# Patient Record
Sex: FEMALE | Race: WHITE | NOT HISPANIC OR LATINO | URBAN - METROPOLITAN AREA
[De-identification: names, ages, dates, MRNs, and addresses within clinical notes are randomized per-mention and may not be internally consistent; named-entity substitution may affect disease eponyms.]

---

## 2022-12-16 ENCOUNTER — TELEPHONE (OUTPATIENT)
Dept: HEALTH INFORMATION MANAGEMENT | Facility: OTHER | Age: 67
End: 2022-12-16

## 2023-01-06 ENCOUNTER — TELEPHONE (OUTPATIENT)
Dept: HEALTH INFORMATION MANAGEMENT | Facility: OTHER | Age: 68
End: 2023-01-06

## 2023-07-13 ENCOUNTER — APPOINTMENT (OUTPATIENT)
Dept: RADIOLOGY | Facility: MEDICAL CENTER | Age: 68
End: 2023-07-13
Attending: ORTHOPAEDIC SURGERY
Payer: MEDICARE

## 2023-07-13 DIAGNOSIS — M23.91 INTERNAL DERANGEMENT OF RIGHT KNEE: ICD-10-CM

## 2023-07-13 DIAGNOSIS — M25.561 RIGHT KNEE PAIN, UNSPECIFIED CHRONICITY: ICD-10-CM

## 2023-07-13 PROCEDURE — 73721 MRI JNT OF LWR EXTRE W/O DYE: CPT

## 2023-10-03 ENCOUNTER — TELEPHONE (OUTPATIENT)
Dept: HEALTH INFORMATION MANAGEMENT | Facility: OTHER | Age: 68
End: 2023-10-03
Payer: MEDICARE

## 2024-04-21 ENCOUNTER — APPOINTMENT (OUTPATIENT)
Dept: RADIOLOGY | Facility: IMAGING CENTER | Age: 69
End: 2024-04-21
Attending: FAMILY MEDICINE
Payer: MEDICARE

## 2024-04-21 ENCOUNTER — OFFICE VISIT (OUTPATIENT)
Dept: URGENT CARE | Facility: CLINIC | Age: 69
End: 2024-04-21
Payer: MEDICARE

## 2024-04-21 VITALS
WEIGHT: 143 LBS | DIASTOLIC BLOOD PRESSURE: 80 MMHG | TEMPERATURE: 98 F | OXYGEN SATURATION: 97 % | HEART RATE: 85 BPM | BODY MASS INDEX: 22.44 KG/M2 | HEIGHT: 67 IN | SYSTOLIC BLOOD PRESSURE: 122 MMHG | RESPIRATION RATE: 20 BRPM

## 2024-04-21 DIAGNOSIS — M25.511 ACUTE PAIN OF RIGHT SHOULDER: ICD-10-CM

## 2024-04-21 PROCEDURE — 3074F SYST BP LT 130 MM HG: CPT | Performed by: FAMILY MEDICINE

## 2024-04-21 PROCEDURE — 73030 X-RAY EXAM OF SHOULDER: CPT | Mod: TC,FY,RT | Performed by: FAMILY MEDICINE

## 2024-04-21 PROCEDURE — 3079F DIAST BP 80-89 MM HG: CPT | Performed by: FAMILY MEDICINE

## 2024-04-21 PROCEDURE — 99203 OFFICE O/P NEW LOW 30 MIN: CPT | Performed by: FAMILY MEDICINE

## 2024-04-21 RX ORDER — PREDNISONE 20 MG/1
40 TABLET ORAL EVERY MORNING
Qty: 10 TABLET | Refills: 0 | Status: SHIPPED | OUTPATIENT
Start: 2024-04-21 | End: 2024-04-21 | Stop reason: SDUPTHER

## 2024-04-21 RX ORDER — PREDNISONE 20 MG/1
40 TABLET ORAL EVERY MORNING
Qty: 10 TABLET | Refills: 0 | Status: SHIPPED | OUTPATIENT
Start: 2024-04-21 | End: 2024-04-26

## 2024-04-21 NOTE — PROGRESS NOTES
"Subjective     Pati Sabillon is a 68 y.o. female who presents with Shoulder Pain (R PAIN 2 DAYS )      - This is a very pleasant 68 y.o. who has come to the walk-in clinic today for approximately 2 to 3 days right shoulder pain anterior.  No trauma nothing new no new activities.  Pain worse with movement better with rest          ALLERGIES:  Patient has no known allergies.     PMH:  History reviewed. No pertinent past medical history.     PSH:  History reviewed. No pertinent surgical history.    MEDS:    Current Outpatient Medications:     predniSONE (DELTASONE) 20 MG Tab, Take 2 Tablets by mouth every morning for 5 days., Disp: 10 Tablet, Rfl: 0    ** I have documented what I find to be significant in regards to past medical, social, family and surgical history  in my HPI or under PMH/PSH/FH review section, otherwise it is noncontributory **         HPI    Review of Systems   Musculoskeletal:  Positive for joint pain.   All other systems reviewed and are negative.             Objective     /80   Pulse 85   Temp 36.7 °C (98 °F) (Temporal)   Resp 20   Ht 1.702 m (5' 7\")   Wt 64.9 kg (143 lb)   SpO2 97%   BMI 22.40 kg/m²      Physical Exam  Vitals and nursing note reviewed.   Constitutional:       General: She is not in acute distress.     Appearance: Normal appearance. She is well-developed.   HENT:      Head: Normocephalic.   Cardiovascular:      Rate and Rhythm: Normal rate and regular rhythm.      Heart sounds: Normal heart sounds.   Pulmonary:      Effort: Pulmonary effort is normal. No respiratory distress.   Musculoskeletal:        Arms:       Comments: Right shoulder with some mild anterior edema.  Mild anterior tenderness to palpation.  Can flex and abduct to about 60 degrees then limited due to pain.  Good strength range of motion of elbow neurovascularly intact.   Neurological:      Mental Status: She is alert.      Motor: No abnormal muscle tone.   Psychiatric:         Mood and Affect: Mood " normal.         Behavior: Behavior normal.                             Assessment & Plan     1. Acute pain of right shoulder  DX-SHOULDER 2+ RIGHT    predniSONE (DELTASONE) 20 MG Tab    Referral to Orthopedics          - Dx, plan & d/c instructions discussed   - OTC  Tylenol as needed      Follow up with your regular primary care providers office within a week to keep them updated and informed of this visit and for regular routine health maintenance check-ups. ER if not improving in 2-3 days or if feeling/getting worse. (If you do not have a primary care provider and need to schedule one you may call [facility] at [phone] to do this).        Patient left in stable condition       Discussed if any testing, labs or imaging studies are obtained outside of the [facility] facility, it is their responsibility to contact the Urgent Care and let us know that it was done and get us the results so adequate follow up can be initiated    Pertinent prior lab work and/or imaging studies in Epic have been reviewed by me today on day of this visit and taken into account for my treatment and plan today    Pertinent PMH/PSH and/or chronic conditions and medications if any were reviewed today and taken into account for my treatment and plan today    Pertinent prior office visit notes in [facility] have been reviewed by me today on day of this visit.    Please note that this dictation may have been created using voice recognition software, if so I have made every reasonable attempt to correct obvious errors, but I expect that there are errors of grammar and possibly content that I did not discover before finalizing the note.

## 2024-06-13 ENCOUNTER — OFFICE VISIT (OUTPATIENT)
Dept: MEDICAL GROUP | Age: 69
End: 2024-06-13
Payer: MEDICARE

## 2024-06-13 VITALS
HEART RATE: 87 BPM | WEIGHT: 142 LBS | HEIGHT: 67 IN | TEMPERATURE: 98.2 F | DIASTOLIC BLOOD PRESSURE: 84 MMHG | SYSTOLIC BLOOD PRESSURE: 134 MMHG | OXYGEN SATURATION: 100 % | BODY MASS INDEX: 22.29 KG/M2

## 2024-06-13 DIAGNOSIS — N95.1 MENOPAUSAL STATE: ICD-10-CM

## 2024-06-13 DIAGNOSIS — Z78.0 POSTMENOPAUSAL STATUS (AGE-RELATED) (NATURAL): ICD-10-CM

## 2024-06-13 DIAGNOSIS — Z00.00 BLOOD TESTS FOR ROUTINE GENERAL PHYSICAL EXAMINATION: ICD-10-CM

## 2024-06-13 DIAGNOSIS — Z12.11 COLON CANCER SCREENING: ICD-10-CM

## 2024-06-13 DIAGNOSIS — D17.1 LIPOMA OF BACK: ICD-10-CM

## 2024-06-13 DIAGNOSIS — Z23 NEED FOR VACCINATION: ICD-10-CM

## 2024-06-13 DIAGNOSIS — M15.9 PRIMARY OSTEOARTHRITIS INVOLVING MULTIPLE JOINTS: ICD-10-CM

## 2024-06-13 DIAGNOSIS — Z12.31 ENCOUNTER FOR SCREENING MAMMOGRAM FOR BREAST CANCER: ICD-10-CM

## 2024-06-13 PROBLEM — M15.0 PRIMARY OSTEOARTHRITIS INVOLVING MULTIPLE JOINTS: Status: ACTIVE | Noted: 2024-06-13

## 2024-06-13 PROCEDURE — 90715 TDAP VACCINE 7 YRS/> IM: CPT | Performed by: STUDENT IN AN ORGANIZED HEALTH CARE EDUCATION/TRAINING PROGRAM

## 2024-06-13 PROCEDURE — 90471 IMMUNIZATION ADMIN: CPT | Performed by: STUDENT IN AN ORGANIZED HEALTH CARE EDUCATION/TRAINING PROGRAM

## 2024-06-13 PROCEDURE — 99214 OFFICE O/P EST MOD 30 MIN: CPT | Mod: 25 | Performed by: STUDENT IN AN ORGANIZED HEALTH CARE EDUCATION/TRAINING PROGRAM

## 2024-06-13 PROCEDURE — 3079F DIAST BP 80-89 MM HG: CPT | Performed by: STUDENT IN AN ORGANIZED HEALTH CARE EDUCATION/TRAINING PROGRAM

## 2024-06-13 PROCEDURE — 3075F SYST BP GE 130 - 139MM HG: CPT | Performed by: STUDENT IN AN ORGANIZED HEALTH CARE EDUCATION/TRAINING PROGRAM

## 2024-06-13 RX ORDER — MELOXICAM 7.5 MG/1
7.5 TABLET ORAL DAILY
Qty: 30 TABLET | Refills: 3 | Status: SHIPPED | OUTPATIENT
Start: 2024-06-13

## 2024-06-13 ASSESSMENT — ENCOUNTER SYMPTOMS
DIZZINESS: 0
HEADACHES: 0
CHILLS: 0
WEAKNESS: 0
BACK PAIN: 0
MYALGIAS: 0
NECK PAIN: 0
FEVER: 0
BRUISES/BLEEDS EASILY: 0
ABDOMINAL PAIN: 0
WHEEZING: 0
COUGH: 0
PALPITATIONS: 0
BLURRED VISION: 0
SHORTNESS OF BREATH: 0
DOUBLE VISION: 0
DEPRESSION: 0
BLOOD IN STOOL: 0

## 2024-06-13 ASSESSMENT — PATIENT HEALTH QUESTIONNAIRE - PHQ9: CLINICAL INTERPRETATION OF PHQ2 SCORE: 0

## 2024-06-13 NOTE — PROGRESS NOTES
Subjective:     CC: Establish care    HPI:   History of Present Illness  The patient is a 68-year-old female coming to [facility].    The patient has been diagnosed with osteoarthritis since the age of 21. In 2022, she sustained a kneecap fracture due to a fall while carrying a flat cart. Despite the fracture, she did not require surgical intervention. She received a cortisone injection on Monday, which took an extended period to heal. Despite undergoing physical therapy, her gait has slowed. Her medical history includes irritable bowel syndrome and hypoglycemia, diagnosed during her youth. She experiences mild shakiness when eating, although she has not been formally diagnosed. Her current medications include Tylenol, as she is unable to take ibuprofen. It has been some time since her last lab work or bone density scan. Her last mammogram was conducted over 10 years ago. She has not received a pneumonia vaccine. She reports severe pain in her hands, which she attributes to arthritis.        Problem   Primary Osteoarthritis Involving Multiple Joints       ROS:  Review of Systems   Constitutional:  Negative for chills, fever and malaise/fatigue.   HENT:  Negative for nosebleeds and tinnitus.    Eyes:  Negative for blurred vision and double vision.   Respiratory:  Negative for cough, shortness of breath and wheezing.    Cardiovascular:  Negative for chest pain, palpitations and leg swelling.   Gastrointestinal:  Negative for abdominal pain, blood in stool and melena.   Genitourinary:  Negative for dysuria, frequency and urgency.   Musculoskeletal:  Positive for joint pain. Negative for back pain, myalgias and neck pain.   Skin:  Negative for rash.   Neurological:  Negative for dizziness, weakness and headaches.   Endo/Heme/Allergies:  Does not bruise/bleed easily.   Psychiatric/Behavioral:  Negative for depression and suicidal ideas.        Objective:     Exam:  /84 (BP Location: Right arm, Patient Position:  "Sitting, BP Cuff Size: Adult)   Pulse 87   Temp 36.8 °C (98.2 °F) (Temporal)   Ht 1.702 m (5' 7\")   Wt 64.4 kg (142 lb)   SpO2 100%   BMI 22.24 kg/m²  Body mass index is 22.24 kg/m².    Physical Exam  Vitals reviewed.   Constitutional:       General: She is not in acute distress.  HENT:      Head: Normocephalic and atraumatic.      Right Ear: Tympanic membrane and ear canal normal.      Left Ear: Tympanic membrane and ear canal normal.      Mouth/Throat:      Mouth: Mucous membranes are moist.   Eyes:      General: No scleral icterus.     Extraocular Movements: Extraocular movements intact.      Pupils: Pupils are equal, round, and reactive to light.   Cardiovascular:      Rate and Rhythm: Normal rate and regular rhythm.      Pulses: Normal pulses.      Heart sounds: Normal heart sounds. No murmur heard.  Pulmonary:      Effort: Pulmonary effort is normal. No respiratory distress.      Breath sounds: Normal breath sounds. No wheezing.   Abdominal:      General: There is no distension.      Palpations: Abdomen is soft.      Tenderness: There is no abdominal tenderness. There is no guarding or rebound.   Musculoskeletal:      Cervical back: Normal range of motion and neck supple.      Right lower leg: No edema.      Left lower leg: No edema.   Lymphadenopathy:      Cervical: No cervical adenopathy.   Skin:     General: Skin is warm.      Capillary Refill: Capillary refill takes less than 2 seconds.      Coloration: Skin is not jaundiced.   Neurological:      General: No focal deficit present.      Mental Status: She is alert.      Cranial Nerves: No cranial nerve deficit.   Psychiatric:         Mood and Affect: Mood normal.         Behavior: Behavior normal.       Labs: Ordered    Assessment & Plan:     68 y.o. female with the following -     1. Primary osteoarthritis involving multiple joints  -Chronic, stable  -Osteoarthritis affecting multiple distal interphalangeal joints and knees  -Currently taking Tylenol " for pain.  In the past took ibuprofen, however it caused significant GI distress  -Patient states mild to moderate pain most days of the week  -I recommended her a trial of Mobic    - meloxicam (MOBIC) 7.5 MG Tab; Take 1 Tablet by mouth every day.  Dispense: 30 Tablet; Refill: 3    2. Lipoma of back  -Chronic large lipoma in her left upper back measuring approximately 15 x 13 cm  -Not causing significant pain  -In the past patient consider undergoing surgery, however postpone it  -I advised the patient that if she does not have significant pain or discomfort she should avoid undergoing this procedure    3. Colon cancer screening  -Due for screening  - COLOGUARD (FIT DNA)    4. Postmenopausal status (age-related) (natural)  5. Menopausal state  -Due for screening  - DS-BONE DENSITY STUDY (DEXA); Future    6. Blood tests for routine general physical examination  -Appropriate lab work for her age  - CBC WITH DIFFERENTIAL; Future  - Comp Metabolic Panel; Future  - HEMOGLOBIN A1C; Future  - Lipid Profile; Future  - VITAMIN D,25 HYDROXY (DEFICIENCY); Future  - TSH; Future    7. Encounter for screening mammogram for breast cancer  -Due for screening  - MA-SCREENING MAMMO BILAT W/CAD; Future    8. Need for vaccination  -Overdue for vaccination  - TDAP VACCINE =>8YO IM       Return in about 4 weeks (around 7/11/2024) for Labs.    Please note that this dictation was created using voice recognition software. I have made every reasonable attempt to correct obvious errors, but I expect that there are errors of grammar and possibly content that I did not discover before finalizing the note.

## 2024-06-17 ENCOUNTER — HOSPITAL ENCOUNTER (OUTPATIENT)
Dept: LAB | Facility: MEDICAL CENTER | Age: 69
End: 2024-06-17
Attending: STUDENT IN AN ORGANIZED HEALTH CARE EDUCATION/TRAINING PROGRAM
Payer: MEDICARE

## 2024-06-17 DIAGNOSIS — Z00.00 BLOOD TESTS FOR ROUTINE GENERAL PHYSICAL EXAMINATION: ICD-10-CM

## 2024-06-17 LAB
25(OH)D3 SERPL-MCNC: 27 NG/ML (ref 30–100)
ALBUMIN SERPL BCP-MCNC: 4.2 G/DL (ref 3.2–4.9)
ALBUMIN/GLOB SERPL: 1.4 G/DL
ALP SERPL-CCNC: 77 U/L (ref 30–99)
ALT SERPL-CCNC: 16 U/L (ref 2–50)
ANION GAP SERPL CALC-SCNC: 12 MMOL/L (ref 7–16)
AST SERPL-CCNC: 22 U/L (ref 12–45)
BASOPHILS # BLD AUTO: 0.9 % (ref 0–1.8)
BASOPHILS # BLD: 0.1 K/UL (ref 0–0.12)
BILIRUB SERPL-MCNC: 0.4 MG/DL (ref 0.1–1.5)
BUN SERPL-MCNC: 16 MG/DL (ref 8–22)
CALCIUM ALBUM COR SERPL-MCNC: 9.3 MG/DL (ref 8.5–10.5)
CALCIUM SERPL-MCNC: 9.5 MG/DL (ref 8.5–10.5)
CHLORIDE SERPL-SCNC: 104 MMOL/L (ref 96–112)
CHOLEST SERPL-MCNC: 167 MG/DL (ref 100–199)
CO2 SERPL-SCNC: 25 MMOL/L (ref 20–33)
CREAT SERPL-MCNC: 0.48 MG/DL (ref 0.5–1.4)
EOSINOPHIL # BLD AUTO: 0.48 K/UL (ref 0–0.51)
EOSINOPHIL NFR BLD: 4.5 % (ref 0–6.9)
ERYTHROCYTE [DISTWIDTH] IN BLOOD BY AUTOMATED COUNT: 46.9 FL (ref 35.9–50)
EST. AVERAGE GLUCOSE BLD GHB EST-MCNC: 117 MG/DL
FASTING STATUS PATIENT QL REPORTED: NORMAL
GFR SERPLBLD CREATININE-BSD FMLA CKD-EPI: 104 ML/MIN/1.73 M 2
GLOBULIN SER CALC-MCNC: 3.1 G/DL (ref 1.9–3.5)
GLUCOSE SERPL-MCNC: 98 MG/DL (ref 65–99)
HBA1C MFR BLD: 5.7 % (ref 4–5.6)
HCT VFR BLD AUTO: 43.3 % (ref 37–47)
HDLC SERPL-MCNC: 75 MG/DL
HGB BLD-MCNC: 14.5 G/DL (ref 12–16)
IMM GRANULOCYTES # BLD AUTO: 0.03 K/UL (ref 0–0.11)
IMM GRANULOCYTES NFR BLD AUTO: 0.3 % (ref 0–0.9)
LDLC SERPL CALC-MCNC: 82 MG/DL
LYMPHOCYTES # BLD AUTO: 1.77 K/UL (ref 1–4.8)
LYMPHOCYTES NFR BLD: 16.5 % (ref 22–41)
MCH RBC QN AUTO: 31.9 PG (ref 27–33)
MCHC RBC AUTO-ENTMCNC: 33.5 G/DL (ref 32.2–35.5)
MCV RBC AUTO: 95.4 FL (ref 81.4–97.8)
MONOCYTES # BLD AUTO: 0.54 K/UL (ref 0–0.85)
MONOCYTES NFR BLD AUTO: 5 % (ref 0–13.4)
NEUTROPHILS # BLD AUTO: 7.81 K/UL (ref 1.82–7.42)
NEUTROPHILS NFR BLD: 72.8 % (ref 44–72)
NRBC # BLD AUTO: 0 K/UL
NRBC BLD-RTO: 0 /100 WBC (ref 0–0.2)
PLATELET # BLD AUTO: 286 K/UL (ref 164–446)
PMV BLD AUTO: 10.4 FL (ref 9–12.9)
POTASSIUM SERPL-SCNC: 4.5 MMOL/L (ref 3.6–5.5)
PROT SERPL-MCNC: 7.3 G/DL (ref 6–8.2)
RBC # BLD AUTO: 4.54 M/UL (ref 4.2–5.4)
SODIUM SERPL-SCNC: 141 MMOL/L (ref 135–145)
TRIGL SERPL-MCNC: 50 MG/DL (ref 0–149)
TSH SERPL DL<=0.005 MIU/L-ACNC: 1.5 UIU/ML (ref 0.38–5.33)
WBC # BLD AUTO: 10.7 K/UL (ref 4.8–10.8)

## 2024-06-17 PROCEDURE — 84443 ASSAY THYROID STIM HORMONE: CPT

## 2024-06-17 PROCEDURE — 85025 COMPLETE CBC W/AUTO DIFF WBC: CPT

## 2024-06-17 PROCEDURE — 83036 HEMOGLOBIN GLYCOSYLATED A1C: CPT

## 2024-06-17 PROCEDURE — 36415 COLL VENOUS BLD VENIPUNCTURE: CPT

## 2024-06-17 PROCEDURE — 82306 VITAMIN D 25 HYDROXY: CPT

## 2024-06-17 PROCEDURE — 80053 COMPREHEN METABOLIC PANEL: CPT

## 2024-06-17 PROCEDURE — 80061 LIPID PANEL: CPT

## 2024-07-09 ENCOUNTER — OFFICE VISIT (OUTPATIENT)
Dept: MEDICAL GROUP | Age: 69
End: 2024-07-09
Payer: MEDICARE

## 2024-07-09 ENCOUNTER — TELEPHONE (OUTPATIENT)
Dept: MEDICAL GROUP | Age: 69
End: 2024-07-09
Payer: MEDICARE

## 2024-07-09 VITALS
WEIGHT: 143 LBS | DIASTOLIC BLOOD PRESSURE: 68 MMHG | HEIGHT: 67 IN | TEMPERATURE: 98 F | BODY MASS INDEX: 22.44 KG/M2 | HEART RATE: 68 BPM | OXYGEN SATURATION: 98 % | SYSTOLIC BLOOD PRESSURE: 126 MMHG

## 2024-07-09 DIAGNOSIS — R73.03 PREDIABETES: ICD-10-CM

## 2024-07-09 DIAGNOSIS — M15.9 PRIMARY OSTEOARTHRITIS INVOLVING MULTIPLE JOINTS: ICD-10-CM

## 2024-07-09 DIAGNOSIS — D17.1 LIPOMA OF BACK: ICD-10-CM

## 2024-07-09 DIAGNOSIS — E55.9 VITAMIN D INSUFFICIENCY: ICD-10-CM

## 2024-07-09 PROCEDURE — 3074F SYST BP LT 130 MM HG: CPT | Performed by: STUDENT IN AN ORGANIZED HEALTH CARE EDUCATION/TRAINING PROGRAM

## 2024-07-09 PROCEDURE — 99214 OFFICE O/P EST MOD 30 MIN: CPT | Performed by: STUDENT IN AN ORGANIZED HEALTH CARE EDUCATION/TRAINING PROGRAM

## 2024-07-09 PROCEDURE — 3078F DIAST BP <80 MM HG: CPT | Performed by: STUDENT IN AN ORGANIZED HEALTH CARE EDUCATION/TRAINING PROGRAM

## 2024-07-09 ASSESSMENT — ENCOUNTER SYMPTOMS
SENSORY CHANGE: 0
CHILLS: 0
BLOOD IN STOOL: 0
HEADACHES: 0
ABDOMINAL PAIN: 0
SHORTNESS OF BREATH: 0
WHEEZING: 0
DOUBLE VISION: 0
BLURRED VISION: 0
DIZZINESS: 0
BRUISES/BLEEDS EASILY: 0
PALPITATIONS: 0
ORTHOPNEA: 0
BACK PAIN: 0
COUGH: 0
DEPRESSION: 0
WEAKNESS: 0
HEARTBURN: 0
FEVER: 0

## 2024-07-09 ASSESSMENT — FIBROSIS 4 INDEX: FIB4 SCORE: 1.269230769230769231

## 2024-07-11 ENCOUNTER — HOSPITAL ENCOUNTER (OUTPATIENT)
Dept: RADIOLOGY | Facility: MEDICAL CENTER | Age: 69
End: 2024-07-11
Attending: STUDENT IN AN ORGANIZED HEALTH CARE EDUCATION/TRAINING PROGRAM
Payer: MEDICARE

## 2024-07-11 DIAGNOSIS — N95.1 MENOPAUSAL STATE: ICD-10-CM

## 2024-07-11 DIAGNOSIS — Z12.31 ENCOUNTER FOR SCREENING MAMMOGRAM FOR BREAST CANCER: ICD-10-CM

## 2024-07-11 DIAGNOSIS — Z78.0 POSTMENOPAUSAL STATUS (AGE-RELATED) (NATURAL): ICD-10-CM

## 2024-07-11 PROCEDURE — 77080 DXA BONE DENSITY AXIAL: CPT

## 2024-07-11 PROCEDURE — 77067 SCR MAMMO BI INCL CAD: CPT

## 2024-11-05 ENCOUNTER — OFFICE VISIT (OUTPATIENT)
Dept: MEDICAL GROUP | Age: 69
End: 2024-11-05
Payer: MEDICARE

## 2024-11-05 VITALS
HEIGHT: 67 IN | OXYGEN SATURATION: 99 % | DIASTOLIC BLOOD PRESSURE: 72 MMHG | HEART RATE: 77 BPM | TEMPERATURE: 98.7 F | SYSTOLIC BLOOD PRESSURE: 120 MMHG | WEIGHT: 143 LBS | BODY MASS INDEX: 22.44 KG/M2

## 2024-11-05 DIAGNOSIS — Z00.00 BLOOD TESTS FOR ROUTINE GENERAL PHYSICAL EXAMINATION: ICD-10-CM

## 2024-11-05 DIAGNOSIS — R73.03 PREDIABETES: ICD-10-CM

## 2024-11-05 DIAGNOSIS — M15.0 PRIMARY OSTEOARTHRITIS INVOLVING MULTIPLE JOINTS: ICD-10-CM

## 2024-11-05 DIAGNOSIS — E55.9 VITAMIN D INSUFFICIENCY: ICD-10-CM

## 2024-11-05 DIAGNOSIS — M85.852 OSTEOPENIA OF NECK OF LEFT FEMUR: ICD-10-CM

## 2024-11-05 DIAGNOSIS — D17.1 LIPOMA OF BACK: ICD-10-CM

## 2024-11-05 DIAGNOSIS — Z00.00 WELLNESS EXAMINATION: ICD-10-CM

## 2024-11-05 PROCEDURE — 3074F SYST BP LT 130 MM HG: CPT | Performed by: STUDENT IN AN ORGANIZED HEALTH CARE EDUCATION/TRAINING PROGRAM

## 2024-11-05 PROCEDURE — G0438 PPPS, INITIAL VISIT: HCPCS | Performed by: STUDENT IN AN ORGANIZED HEALTH CARE EDUCATION/TRAINING PROGRAM

## 2024-11-05 PROCEDURE — 99214 OFFICE O/P EST MOD 30 MIN: CPT | Mod: 25 | Performed by: STUDENT IN AN ORGANIZED HEALTH CARE EDUCATION/TRAINING PROGRAM

## 2024-11-05 PROCEDURE — 3078F DIAST BP <80 MM HG: CPT | Performed by: STUDENT IN AN ORGANIZED HEALTH CARE EDUCATION/TRAINING PROGRAM

## 2024-11-05 RX ORDER — MELOXICAM 7.5 MG/1
7.5 TABLET ORAL DAILY
Qty: 30 TABLET | Refills: 3 | Status: SHIPPED | OUTPATIENT
Start: 2024-11-05

## 2024-11-05 ASSESSMENT — FIBROSIS 4 INDEX: FIB4 SCORE: 1.288461538461538462

## 2024-11-05 NOTE — PROGRESS NOTES
Subjective:     CC:   Chief Complaint   Patient presents with    Annual Exam       HPI:   History of Present Illness  The patient is a 69-year-old female presenting for an annual exam.    She has undergone a mammogram, DEXA scan, and Cologuard test, all of which returned normal results.    She has been diagnosed with osteopenia and is currently taking vitamin D and calcium supplements. She is also engaging in weight-bearing exercises, including calf raises and balance exercises, as part of her physical therapy regimen.    She has been diagnosed with arthritis in her shoulders and hands. She has previously taken meloxicam, which caused stomach discomfort. She occasionally takes Tylenol PM at night for knee pain.    She has a known allergy to ibuprofen, which causes stomach issues and petechiae. She has experienced angioedema, hives, and itching over the past decade. She has a known allergy to walnuts, which causes itchiness on the side of her mouth or tongue, and occasionally results in a canker sore. She avoids nuts in her diet. She has experienced gastrointestinal issues and vomiting after consuming lobster.    She has made dietary changes, including the addition of whole wheat English muffins and sourdough bread to her breakfast, and has eliminated other breakfast breads from her diet.    FAMILY HISTORY  Her sister has osteoporosis.    ALLERGIES  She is allergic to IBUPROFEN (stomach upset), JUNIPER BERRIES, WALNUTS, and LOBSTER.       Ob-Gyn/ History:    Patient has GYN provider: no  /Para:  0  Last Pap Smear: At around age 62-63. No history of abnormal pap smears.  Gyn Surgery:  No.  Current Contraceptive Method:  N/A. She is currently sexually active.  Last menstrual period:  At age 52.  Periods were regular. moderate bleeding. Cramping was severe.   She took OTC analgesics for cramps.  No significant bloating/fluid retention, pelvic pain, or dyspareunia. No vaginal discharge  Post-menopausal bleeding:  No  Urinary incontinence: No  Folate intake: N/A     Health Maintenance  Advanced directive: Recommended.    Osteoporosis Screen/ DEXA: To date   PT for falls prevention: Not high risk   Cholesterol Screening: Did not   Diabetes Screening: Screened   Aspirin Use: N/A     Anticipatory Guidance  Diet: Regular diet, no restrictions.    Exercise: Yes, walking.    Substance Abuse: No   Safe in relationship.   Seat belts, bike helmet, gun safety discussed.  Sun protection used.  Dental Home.    Cancer screening  Colorectal Cancer Screening: Up-to-date   Lung Cancer Screening: N/A   Cervical Cancer Screening: Up-to-date   Breast Cancer Screening: Up-to-date     Infectious disease screening/Immunizations  --STI Screening: N/A   --Practices safe sex.  --HIV Screening: N/A   --Hepatitis C Screening: Screened   --Immunizations:    Influenza: Up-to-date    HPV: N/A    Tetanus: Up-to-date    Shingles: n/a    Pneumococcal : Up-to-date       Hepatitis B: Up-to-date  COVID-19: Booster  Other immunizations: N/A     She  has no past medical history on file.  She  has no past surgical history on file.    Family History   Problem Relation Age of Onset    Breast Cancer Mother     Gout Father     Asthma Sister     Kidney Disease Paternal Grandfather        Social History     Socioeconomic History    Marital status:      Spouse name: Not on file    Number of children: Not on file    Years of education: Not on file    Highest education level: Not on file   Occupational History    Not on file   Tobacco Use    Smoking status: Never    Smokeless tobacco: Never   Vaping Use    Vaping status: Never Used   Substance and Sexual Activity    Alcohol use: Yes     Alcohol/week: 0.6 oz     Types: 1 Standard drinks or equivalent per week    Drug use: Never    Sexual activity: Yes     Partners: Male   Other Topics Concern    Not on file   Social History Narrative    Not on file     Social Drivers of Health     Financial Resource Strain: Not on  "file   Food Insecurity: Not on file   Transportation Needs: Not on file   Physical Activity: Not on file   Stress: Not on file   Social Connections: Not on file   Intimate Partner Violence: Not on file   Housing Stability: Not on file       Patient Active Problem List    Diagnosis Date Noted    Prediabetes 07/09/2024    Vitamin D insufficiency 07/09/2024    Primary osteoarthritis involving multiple joints 06/13/2024    Lipoma of back 06/13/2024         Current Outpatient Medications   Medication Sig Dispense Refill    meloxicam (MOBIC) 7.5 MG Tab Take 1 Tablet by mouth every day. 30 Tablet 3     No current facility-administered medications for this visit.     Allergies   Allergen Reactions    Ibuprofen Unspecified     Upsets stomach       Review of Systems   Constitutional: Negative for fever, chills and malaise/fatigue.   HENT: Negative for congestion.    Eyes: Negative for pain.    Respiratory: Negative for cough and shortness of breath.  Cardiovascular: Negative for leg swelling.   Gastrointestinal: Negative for nausea, vomiting, abdominal pain and diarrhea.   Genitourinary: Negative for dysuria and hematuria.   Skin: Negative for rash.   Neurological: Negative for dizziness, focal weakness and headaches.   Endo/Heme/Allergies: Does not bleed easily.   Psychiatric/Behavioral: Negative for depression.  The patient is not nervous/anxious.      Objective:     /72 (BP Location: Right arm, Patient Position: Sitting, BP Cuff Size: Adult)   Pulse 77   Temp 37.1 °C (98.7 °F) (Temporal)   Ht 1.702 m (5' 7\")   Wt 64.9 kg (143 lb)   SpO2 99%   BMI 22.40 kg/m²   Body mass index is 22.4 kg/m².  Wt Readings from Last 4 Encounters:   11/05/24 64.9 kg (143 lb)   07/09/24 64.9 kg (143 lb)   06/13/24 64.4 kg (142 lb)   04/26/24 63.5 kg (140 lb)       Physical Exam:  Constitutional: Well-developed and well-nourished. Not diaphoretic. No distress.   Skin: Skin is warm and dry. No rash noted.  Head: Atraumatic without " lesions.  Eyes: Conjunctivae and extraocular motions are normal. Pupils are equal, round, and reactive to light. No scleral icterus.   Ears:  External ears unremarkable. Tympanic membranes clear and intact.  Nose: Nares patent. Septum midline. Turbinates without erythema nor edema. No discharge.   Mouth/Throat: Dentition is good. Tongue normal. Oropharynx is clear and moist. Posterior pharynx without erythema or exudates.  Neck: Supple, trachea midline. Normal range of motion. No thyromegaly present. No lymphadenopathy--cervical or supraclavicular.  Cardiovascular: Regular rate and rhythm, S1 and S2 without murmur, rubs, or gallops.  Lungs: Normal inspiratory effort, CTA bilaterally, no wheezes/rhonchi/rales  Breast: Deferred.  Abdomen: Soft, non tender, and without distention. Active bowel sounds in all four quadrants. No rebound, guarding, masses or HSM.  : deferred.  Extremities: No cyanosis, clubbing, erythema, nor edema. Distal pulses intact and symmetric.   Musculoskeletal: All major joints AROM full in all directions without pain.  Neurological: Alert and oriented x 3. DTRs 2+/3 and symmetric. No cranial nerve deficit. 5/5 myotomes. Sensation intact.   Psychiatric:  Behavior, mood, and affect are appropriate.    A chaperone was offered to the patient during today's exam.: Patient declined.    Assessment and Plan:     1. Wellness examination  -Presented for scheduled annual examination  -Chronic condition listed below  -New Diagnosis of osteopenia    2. Primary osteoarthritis involving multiple joints  A prescription for meloxicam was provided. She is instructed to take it with food to minimize stomach upset. If she experiences any adverse reactions, such as stomach issues or petechiae, she should stop taking it immediately.  - meloxicam (MOBIC) 7.5 MG Tab; Take 1 Tablet by mouth every day.  Dispense: 30 Tablet; Refill: 3    3. Prediabetes  -Most recent A1c 5.7%  -Her diet has been modified to include whole  wheat and sourdough bread while reducing sugar intake. Basic lab work, including blood sugar levels, will be ordered for her next visit to monitor her condition.  - HEMOGLOBIN A1C; Future    4. Lipoma of back  -Chronic, Stable  -Causing significant pain/distress  -If worsens or cause significant pain we will consider referral to surgeon    5.  Osteopenia of neck of left femur  She is advised to take vitamin D and calcium supplements and to perform weight-bearing exercises, including walking and using small weights.   Repeat DEXA scan in 2 years.      6. Vitamin D insufficiency  -History vitamin D insufficiency  -Take over-the-counter vitamin D 2000 units daily  - VITAMIN D,25 HYDROXY (DEFICIENCY); Future    7. Blood tests for routine general physical examination  -Routine screening labs  - CBC WITH DIFFERENTIAL; Future     Health Maintenance.  All recent screenings, including mammogram, DEXA scan, and Cologuard, returned normal results. She is advised to continue regular screenings as recommended.      HCM:  N/A   Labs per orders  Immunizations per orders  Patient counseled about skin care, diet, supplements, prenatal vitamins, safe sex and exercise.    Follow-up: Return in about 6 months (around 5/5/2025) for Labs.

## 2024-11-05 NOTE — PATIENT INSTRUCTIONS
-Start Mobic daily  -Continue regular exercise  -Take Vit D and calcium  -Repeat labs prior next visit  -Follow up in 6 months

## 2024-12-10 ENCOUNTER — DOCUMENTATION (OUTPATIENT)
Dept: HEALTH INFORMATION MANAGEMENT | Facility: OTHER | Age: 69
End: 2024-12-10
Payer: MEDICARE

## 2024-12-25 ENCOUNTER — OFFICE VISIT (OUTPATIENT)
Dept: URGENT CARE | Facility: CLINIC | Age: 69
End: 2024-12-25
Payer: MEDICARE

## 2024-12-25 VITALS
DIASTOLIC BLOOD PRESSURE: 82 MMHG | OXYGEN SATURATION: 97 % | SYSTOLIC BLOOD PRESSURE: 124 MMHG | WEIGHT: 139 LBS | HEIGHT: 67 IN | BODY MASS INDEX: 21.82 KG/M2 | RESPIRATION RATE: 14 BRPM | TEMPERATURE: 98.3 F | HEART RATE: 85 BPM

## 2024-12-25 DIAGNOSIS — S61.412A LACERATION OF LEFT HAND WITHOUT FOREIGN BODY, INITIAL ENCOUNTER: ICD-10-CM

## 2024-12-25 PROCEDURE — 12001 RPR S/N/AX/GEN/TRNK 2.5CM/<: CPT | Performed by: PHYSICIAN ASSISTANT

## 2024-12-25 ASSESSMENT — ENCOUNTER SYMPTOMS: ROS SKIN COMMENTS: LEFT HAND LACERATION

## 2024-12-25 ASSESSMENT — FIBROSIS 4 INDEX: FIB4 SCORE: 1.288461538461538462

## 2024-12-25 NOTE — PROCEDURES
Laceration Repair    Date/Time: 12/25/2024 3:33 PM    Performed by: Rolf Estevez P.A.-C.  Authorized by: Rolf Estevez P.A.-C.  Laceration length: 2 cm  Anesthesia: local infiltration    Anesthesia:  Local Anesthetic: lidocaine 1% without epinephrine  Anesthetic total: 1 mL  Preparation: Patient was prepped and draped in the usual sterile fashion.  Irrigation solution: saline  Irrigation method: syringe  Amount of cleaning: standard  Debridement: none  Degree of undermining: none  Skin closure: 4-0 nylon  Number of sutures: 3  Technique: simple  Approximation: close  Approximation difficulty: simple  Dressing: antibiotic ointment  Patient tolerance: patient tolerated the procedure well with no immediate complications          Patient tolerated procedure well.

## 2024-12-25 NOTE — PROGRESS NOTES
"  Subjective:   Kristin Sabillon is a 69 y.o. female who presents today with   Chief Complaint   Patient presents with    Laceration     L hand medial cut, cut hand w/ a bowl recent tetanus in June this year     Laceration   The incident occurred less than 1 hour ago. The laceration is located on the Left hand. The laceration is 2 cm in size. Injury mechanism: broken ceramic bowl. The pain is mild. The pain has been Constant since onset. She reports no foreign bodies present. Her tetanus status is UTD.     Tetanus last updated in June 2024.  Patient was getting a ceramic bowl down from the cabinet and it broke causing a cut to her hand.  She states she did not even know that it was cut until she saw blood on the ground.  It did not smash her hand.    PMH:  has no past medical history on file.  MEDS:   Current Outpatient Medications:     meloxicam (MOBIC) 7.5 MG Tab, Take 1 Tablet by mouth every day., Disp: 30 Tablet, Rfl: 3  ALLERGIES:   Allergies   Allergen Reactions    Ibuprofen Unspecified     Upsets stomach     SURGHX: No past surgical history on file.  SOCHX:  reports that she has never smoked. She has never used smokeless tobacco. She reports current alcohol use of about 0.6 oz of alcohol per week. She reports that she does not use drugs.  FH: Reviewed with patient, not pertinent to this visit.     Review of Systems   Skin:         Left hand laceration      Objective:   /82 (BP Location: Left arm, Patient Position: Sitting, BP Cuff Size: Adult long)   Pulse 85   Temp 36.8 °C (98.3 °F) (Temporal)   Resp 14   Ht 1.702 m (5' 7\")   Wt 63 kg (139 lb)   SpO2 97%   BMI 21.77 kg/m²   Physical Exam  Vitals and nursing note reviewed.   Constitutional:       General: She is not in acute distress.     Appearance: Normal appearance. She is well-developed. She is not ill-appearing or toxic-appearing.   HENT:      Head: Normocephalic and atraumatic.      Right Ear: Hearing normal.      Left Ear: Hearing normal. "   Cardiovascular:      Rate and Rhythm: Normal rate.   Pulmonary:      Effort: Pulmonary effort is normal.   Musculoskeletal:      Comments: No bony tenderness to palpation of left hand.  Neurovascular intact distally.  Patient able to flex and extend the fifth digit of the left hand.  Slight limitations secondary to arthritis at baseline.   Skin:     General: Skin is warm and dry.             Comments: 2 cm laceration to the medial aspect of the left hand that is partial-thickness.  No deeper involvement.  No tendon involvement.  No foreign body.   Neurological:      Mental Status: She is alert.      Coordination: Coordination normal.   Psychiatric:         Mood and Affect: Mood normal.       Assessment/Plan:   Assessment    1. Laceration of left hand without foreign body, initial encounter    Other orders  - Laceration Repair    Symptoms and presentation are consistent with laceration which was repaired following receiving patient's verbal consent.  Area was cleaned copiously with Hibiclens and saline prior to procedure.  No indication for x-ray on exam today.  No need for antibiotics at this time although discussed any signs of infection and would suggest returning at that time.  Would recommend following up in 7 to 10 days for suture removal.  Wound care instructions discussed.  Antibiotic ointment and bandage placed today.    Please note that this dictation was created using voice recognition software. I have made every reasonable attempt to correct obvious errors, but I expect that there are errors of grammar and possibly content that I did not discover before finalizing the note.    Rolf Estevez PA-C

## 2025-01-03 ENCOUNTER — OFFICE VISIT (OUTPATIENT)
Dept: URGENT CARE | Facility: CLINIC | Age: 70
End: 2025-01-03
Payer: MEDICARE

## 2025-01-03 VITALS
HEIGHT: 67 IN | TEMPERATURE: 97.9 F | BODY MASS INDEX: 21.82 KG/M2 | DIASTOLIC BLOOD PRESSURE: 68 MMHG | RESPIRATION RATE: 16 BRPM | SYSTOLIC BLOOD PRESSURE: 110 MMHG | HEART RATE: 74 BPM | WEIGHT: 139 LBS | OXYGEN SATURATION: 98 %

## 2025-01-03 DIAGNOSIS — Z48.02 VISIT FOR SUTURE REMOVAL: ICD-10-CM

## 2025-01-03 PROCEDURE — 3078F DIAST BP <80 MM HG: CPT | Performed by: PHYSICIAN ASSISTANT

## 2025-01-03 PROCEDURE — 3074F SYST BP LT 130 MM HG: CPT | Performed by: PHYSICIAN ASSISTANT

## 2025-01-03 PROCEDURE — 99212 OFFICE O/P EST SF 10 MIN: CPT | Performed by: PHYSICIAN ASSISTANT

## 2025-01-03 ASSESSMENT — ENCOUNTER SYMPTOMS
NAUSEA: 0
TINGLING: 0
SENSORY CHANGE: 0
VOMITING: 0
FEVER: 0
CHILLS: 0

## 2025-01-03 ASSESSMENT — FIBROSIS 4 INDEX: FIB4 SCORE: 1.288461538461538462

## 2025-01-03 NOTE — PROGRESS NOTES
"Subjective     Pati Sabillon is a 69 y.o. female who presents with Suture / Staple Removal (Lt hand, placed on 12/25/24, 3 Sutures placed, still a bit tender/red )    Hpi:  Kristin Sabillon is a 69 y.o. female who presents today for suture removal.  Patient was seen in the urgent care on 12/25 for evaluation of laceration to her left hand.  Laceration was repaired with 3 simple directed sutures.  She says that it stopped draining after the third day and looks good overall but to still have some soreness and tenderness to the area.  No purulent discharge or fever.  No distal numbness/tingling.        Review of Systems   Constitutional:  Negative for chills and fever.   Gastrointestinal:  Negative for nausea and vomiting.   Skin:         Healing laceration of left hand   Neurological:  Negative for tingling and sensory change.         PMH:  has no past medical history on file.  MEDS:   Current Outpatient Medications:     meloxicam (MOBIC) 7.5 MG Tab, Take 1 Tablet by mouth every day. (Patient not taking: Reported on 1/3/2025), Disp: 30 Tablet, Rfl: 3  ALLERGIES:   Allergies   Allergen Reactions    Ibuprofen Unspecified     Upsets stomach     SURGHX: History reviewed. No pertinent surgical history.  SOCHX:  reports that she has never smoked. She has never used smokeless tobacco. She reports that she does not currently use alcohol after a past usage of about 0.6 oz of alcohol per week. She reports that she does not use drugs.  FH: Family history was reviewed, no pertinent findings to report      Objective     /68 (BP Location: Left arm, Patient Position: Sitting, BP Cuff Size: Adult)   Pulse 74   Temp 36.6 °C (97.9 °F) (Temporal)   Resp 16   Ht 1.702 m (5' 7\")   Wt 63 kg (139 lb)   SpO2 98%   BMI 21.77 kg/m²      Physical Exam  Musculoskeletal:      Comments: Left hand: Ulnar/dorsal aspect of left hand, near the fifth MCP joint, exhibits a healing laceration with 3 simple sutures in place.  No discharge " or streaking.  No swelling.  No concerning erythema.  Wound is well-approximated.  The central suture is completely embedded in the scab.  She has full ROM of the affected digit.  Distal neurovascular status intact.  Cap refill less than 2 seconds.             Assessment & Plan     1. Visit for suture removal  All 3 sutures were removed.  A little bit of difficulty removing the central 1 as it was completely embedded underneath the healed wound.  Very slight bleeding after this but wound remained well-approximated.  Patient tolerated well.  Continued wound care instructions discussed.  Follow-up as needed.

## 2025-01-29 ENCOUNTER — OFFICE VISIT (OUTPATIENT)
Dept: MEDICAL GROUP | Age: 70
End: 2025-01-29
Payer: MEDICARE

## 2025-01-29 VITALS
OXYGEN SATURATION: 100 % | HEART RATE: 98 BPM | WEIGHT: 144 LBS | SYSTOLIC BLOOD PRESSURE: 124 MMHG | TEMPERATURE: 97.1 F | BODY MASS INDEX: 22.6 KG/M2 | HEIGHT: 67 IN | DIASTOLIC BLOOD PRESSURE: 72 MMHG

## 2025-01-29 DIAGNOSIS — M15.0 PRIMARY OSTEOARTHRITIS INVOLVING MULTIPLE JOINTS: ICD-10-CM

## 2025-01-29 DIAGNOSIS — R73.03 PREDIABETES: ICD-10-CM

## 2025-01-29 DIAGNOSIS — J06.9 ACUTE URI: ICD-10-CM

## 2025-01-29 ASSESSMENT — ENCOUNTER SYMPTOMS
BRUISES/BLEEDS EASILY: 0
DIZZINESS: 0
HEMOPTYSIS: 0
BACK PAIN: 0
STRIDOR: 0
WEAKNESS: 0
WEIGHT LOSS: 0
SINUS PAIN: 0
HEADACHES: 0
PALPITATIONS: 0
DIAPHORESIS: 0
WHEEZING: 0
SORE THROAT: 0
COUGH: 1
DOUBLE VISION: 0
CHILLS: 0
SHORTNESS OF BREATH: 0
ORTHOPNEA: 0
SPUTUM PRODUCTION: 0
BLURRED VISION: 0
DEPRESSION: 0
BLOOD IN STOOL: 0
ABDOMINAL PAIN: 0
HEARTBURN: 0
FEVER: 0

## 2025-01-29 ASSESSMENT — PATIENT HEALTH QUESTIONNAIRE - PHQ9: CLINICAL INTERPRETATION OF PHQ2 SCORE: 0

## 2025-01-29 ASSESSMENT — FIBROSIS 4 INDEX: FIB4 SCORE: 1.288461538461538462

## 2025-01-29 NOTE — PATIENT INSTRUCTIONS
.Educated patient that majority of upper respiratory infections are viral and do not need antibiotics.   Twice daily use of nasal saline rinse or Neti-Pot.   OTC anti-pyretics and decongestants as needed.  Follow-up in office or urgent care for worsening symptoms, difficulty breathing, lack of expected recovery, or should new symptoms or problems arise.     Koko Palacios)

## 2025-01-29 NOTE — PROGRESS NOTES
"Chief Complaint   Patient presents with    Cough     Cough for 1 week, runny nose, congestion, ear ache.       HPI: Patient is a 69 y.o. female complaining of 7 days of illness including: Rhinorrhea, sneezing, coughing, malaise, and sinus congestion.  Mucus is: thin.  Similarly ill exposures: yes.  Treatments tried: DayQuil, NyQuil  She  reports that she has never smoked. She has never used smokeless tobacco..     ROS:  Review of Systems   Constitutional:  Positive for malaise/fatigue. Negative for chills, diaphoresis, fever and weight loss.   HENT:  Positive for congestion. Negative for ear discharge, nosebleeds, sinus pain, sore throat and tinnitus.    Eyes:  Negative for blurred vision and double vision.   Respiratory:  Positive for cough. Negative for hemoptysis, sputum production, shortness of breath, wheezing and stridor.    Cardiovascular:  Negative for chest pain, palpitations, orthopnea and leg swelling.   Gastrointestinal:  Negative for abdominal pain, blood in stool, heartburn and melena.   Genitourinary:  Negative for dysuria, frequency and urgency.   Musculoskeletal:  Negative for back pain and joint pain.   Skin:  Negative for rash.   Neurological:  Negative for dizziness, weakness and headaches.   Endo/Heme/Allergies:  Does not bruise/bleed easily.   Psychiatric/Behavioral:  Negative for depression and suicidal ideas.          I reviewed the patient's medications, allergies and medical history:  Current Outpatient Medications   Medication Sig Dispense Refill    meloxicam (MOBIC) 7.5 MG Tab Take 1 Tablet by mouth every day. 30 Tablet 3     No current facility-administered medications for this visit.     Ibuprofen  History reviewed. No pertinent past medical history.     EXAM:  /72 (BP Location: Left arm, Patient Position: Sitting, BP Cuff Size: Adult)   Pulse 98   Temp 36.2 °C (97.1 °F) (Temporal)   Ht 1.702 m (5' 7\")   Wt 65.3 kg (144 lb)   SpO2 100%   General: Alert, no conversational " dyspnea or audible wheeze, non-toxic appearance.  Eyes: PERRL, normal conjunctiva, no eye discharge.  Ears: Normal pinnae,TM's normal bilaterally.  Nares: Patent with thin mucus.  Sinuses: non tender over maxillary / frontal sinuses.  Throat: mild Erythematous injection without exudate.   Neck: Supple, with no adenopathy.  Lungs: Clear to auscultation bilaterally, no wheeze, crackles or rhonchi.   Heart: Regular rate without murmur.  Skin: Warm and dry without rash.     ASSESSMENT:     1. Acute URI  Her symptoms, including cough, congestion, and lethargy, are indicative of an upper respiratory infection. She reports no fever but experiences chills and shortness of breath during coughing fits. Examination reveals normal lung sounds and no swollen lymph nodes. Symptomatic treatment is recommended, including the use of over-the-counter NyQuil for nighttime cough relief and Robitussin Extra Strength for severe coughing episodes. She is advised to drink plenty of fluids and consider using a neti pot for nasal congestion. Tylenol is recommended for fever or pain management. If the cough persists beyond 2 weeks, she should contact via MyChart for a prescription for cough medication.    PLAN:  1. Educated patient that majority of upper respiratory infections are viral and do not need antibiotics.   2. Twice daily use of nasal saline rinse or Neti-Pot.  3. OTC anti-pyretics and decongestants as needed.  4. Follow-up in office or urgent care for worsening symptoms, difficulty breathing, lack of expected recovery, or should new symptoms or problems arise.    2. Prediabetes  -Chronic, stable  -Most recent A1c in June 2024 was 5.7%  -Encouraged her exercise, DASH diet or plant-based diet  -Repeat blood work in the summer    3. Primary osteoarthritis involving multiple joints  -Chronic, stable  -Currently taking Mobic 7.5 mg daily as needed  -Continue same therapy

## 2025-02-10 DIAGNOSIS — R05.2 SUBACUTE COUGH: ICD-10-CM

## 2025-02-10 RX ORDER — BENZONATATE 100 MG/1
100 CAPSULE ORAL 3 TIMES DAILY PRN
Qty: 30 CAPSULE | Refills: 0 | Status: SHIPPED | OUTPATIENT
Start: 2025-02-10 | End: 2025-02-20

## 2025-02-20 ENCOUNTER — APPOINTMENT (OUTPATIENT)
Dept: RADIOLOGY | Facility: MEDICAL CENTER | Age: 70
End: 2025-02-20
Attending: STUDENT IN AN ORGANIZED HEALTH CARE EDUCATION/TRAINING PROGRAM
Payer: MEDICARE

## 2025-02-20 ENCOUNTER — TELEPHONE (OUTPATIENT)
Dept: MEDICAL GROUP | Age: 70
End: 2025-02-20

## 2025-02-20 ENCOUNTER — OFFICE VISIT (OUTPATIENT)
Dept: MEDICAL GROUP | Age: 70
End: 2025-02-20
Payer: MEDICARE

## 2025-02-20 VITALS
TEMPERATURE: 97.4 F | HEIGHT: 67 IN | HEART RATE: 94 BPM | DIASTOLIC BLOOD PRESSURE: 70 MMHG | SYSTOLIC BLOOD PRESSURE: 120 MMHG | OXYGEN SATURATION: 99 % | BODY MASS INDEX: 21.97 KG/M2 | WEIGHT: 140 LBS

## 2025-02-20 DIAGNOSIS — J20.9 ACUTE BRONCHITIS, UNSPECIFIED ORGANISM: ICD-10-CM

## 2025-02-20 DIAGNOSIS — R05.8 SPUTUM PRODUCTION: ICD-10-CM

## 2025-02-20 DIAGNOSIS — R05.2 SUBACUTE COUGH: ICD-10-CM

## 2025-02-20 DIAGNOSIS — R06.2 WHEEZING: ICD-10-CM

## 2025-02-20 DIAGNOSIS — M15.0 PRIMARY OSTEOARTHRITIS INVOLVING MULTIPLE JOINTS: ICD-10-CM

## 2025-02-20 PROCEDURE — 3078F DIAST BP <80 MM HG: CPT | Performed by: STUDENT IN AN ORGANIZED HEALTH CARE EDUCATION/TRAINING PROGRAM

## 2025-02-20 PROCEDURE — 71046 X-RAY EXAM CHEST 2 VIEWS: CPT

## 2025-02-20 PROCEDURE — 99214 OFFICE O/P EST MOD 30 MIN: CPT | Performed by: STUDENT IN AN ORGANIZED HEALTH CARE EDUCATION/TRAINING PROGRAM

## 2025-02-20 PROCEDURE — 3074F SYST BP LT 130 MM HG: CPT | Performed by: STUDENT IN AN ORGANIZED HEALTH CARE EDUCATION/TRAINING PROGRAM

## 2025-02-20 RX ORDER — PREDNISONE 20 MG/1
40 TABLET ORAL DAILY
Qty: 10 TABLET | Refills: 0 | Status: SHIPPED | OUTPATIENT
Start: 2025-02-20

## 2025-02-20 RX ORDER — ALBUTEROL SULFATE 90 UG/1
2 INHALANT RESPIRATORY (INHALATION) EVERY 4 HOURS PRN
Qty: 1 EACH | Refills: 0 | Status: SHIPPED | OUTPATIENT
Start: 2025-02-20

## 2025-02-20 ASSESSMENT — ENCOUNTER SYMPTOMS
ORTHOPNEA: 0
BRUISES/BLEEDS EASILY: 0
SPUTUM PRODUCTION: 1
COUGH: 1
DOUBLE VISION: 0
HEADACHES: 0
HEMOPTYSIS: 0
CHILLS: 0
BACK PAIN: 0
ABDOMINAL PAIN: 0
SHORTNESS OF BREATH: 0
PALPITATIONS: 0
BLOOD IN STOOL: 0
DEPRESSION: 0
DIZZINESS: 0
BLURRED VISION: 0
WEAKNESS: 0
WHEEZING: 1
FEVER: 0

## 2025-02-20 ASSESSMENT — FIBROSIS 4 INDEX: FIB4 SCORE: 1.288461538461538462

## 2025-02-20 NOTE — PATIENT INSTRUCTIONS
-Xrays now  -Take prednisone 40 mg daily for 5 days  -Use rescue inhaler as needed  -I will call you once imaging report available

## 2025-02-20 NOTE — PROGRESS NOTES
"Subjective:     CC: Worsening cough    HPI:   History of Present Illness  The patient is a 69-year-old female presenting for persistent upper respiratory symptoms.    She reports that her cold has now progressed to her chest, which started on Sunday. She describes experiencing fatigue, coughing fits, and an increase in phlegm production. Her cough is characterized by a sharp sound, indicative of increased chest congestion. She also experiences shortness of breath during these coughing episodes. She has a lifelong history of bronchitis, typically managed with antibiotics, but this is the first instance where she has not had a fever. She expresses concern about the possibility of being contagious.    FAMILY HISTORY  Her sister has asthma.       ROS:  Review of Systems   Constitutional:  Negative for chills, fever and malaise/fatigue.   HENT:  Negative for nosebleeds and tinnitus.    Eyes:  Negative for blurred vision and double vision.   Respiratory:  Positive for cough, sputum production and wheezing. Negative for hemoptysis and shortness of breath.    Cardiovascular:  Negative for chest pain, palpitations, orthopnea and leg swelling.   Gastrointestinal:  Negative for abdominal pain, blood in stool and melena.   Genitourinary:  Negative for dysuria, frequency and urgency.   Musculoskeletal:  Negative for back pain and joint pain.   Skin:  Negative for rash.   Neurological:  Negative for dizziness, weakness and headaches.   Endo/Heme/Allergies:  Does not bruise/bleed easily.   Psychiatric/Behavioral:  Negative for depression and suicidal ideas.        Objective:     Exam:  /70 (BP Location: Right arm, Patient Position: Sitting, BP Cuff Size: Adult)   Pulse 94   Temp 36.3 °C (97.4 °F) (Temporal)   Ht 1.702 m (5' 7\")   Wt 63.5 kg (140 lb)   SpO2 99%   BMI 21.93 kg/m²  Body mass index is 21.93 kg/m².    Physical Exam  Vitals reviewed.   Constitutional:       General: She is not in acute distress.  HENT:      " Head: Normocephalic and atraumatic.      Mouth/Throat:      Mouth: Mucous membranes are moist.   Eyes:      General: No scleral icterus.     Extraocular Movements: Extraocular movements intact.      Pupils: Pupils are equal, round, and reactive to light.   Cardiovascular:      Rate and Rhythm: Normal rate and regular rhythm.      Pulses: Normal pulses.      Heart sounds: Normal heart sounds. No murmur heard.  Pulmonary:      Effort: Pulmonary effort is normal. No respiratory distress.      Breath sounds: Normal breath sounds. No wheezing.   Abdominal:      General: There is no distension.      Palpations: Abdomen is soft.      Tenderness: There is no abdominal tenderness. There is no guarding or rebound.   Musculoskeletal:      Cervical back: Normal range of motion and neck supple.      Right lower leg: No edema.      Left lower leg: No edema.   Skin:     General: Skin is warm.      Capillary Refill: Capillary refill takes less than 2 seconds.      Coloration: Skin is not jaundiced.      Findings: No bruising.   Neurological:      General: No focal deficit present.      Mental Status: She is alert.      Cranial Nerves: No cranial nerve deficit.      Sensory: No sensory deficit.   Psychiatric:         Mood and Affect: Mood normal.         Behavior: Behavior normal.       Labs: None    Assessment & Plan:     69 y.o. female with the following -     1. Acute bronchitis, unspecified organism  2. Wheezing  3. Sputum production  4. Subacute cough  The persistent cough may be attributed to wheezing and subsequent lung tightening. There is no fever present, which is unusual for her typical bronchitis episodes. A chest x-ray will be ordered to rule out pneumonia. She will be prescribed a steroid inhaler for use during episodes of wheezing or increased cough and mucus production. Additionally, a 5-day course of prednisone 40 mg, 2 tablets daily in the morning, will be initiated. The x-ray results will be reviewed upon  receipt.  - predniSONE (DELTASONE) 20 MG Tab; Take 2 Tablets by mouth every day.  Dispense: 10 Tablet; Refill: 0  - albuterol 108 (90 Base) MCG/ACT Aero Soln inhalation aerosol; Inhale 2 Puffs every four hours as needed for Shortness of Breath.  Dispense: 1 Each; Refill: 0  - DX-CHEST-2 VIEWS; Future    5. Primary osteoarthritis involving multiple joints  -Chronic, stable  -Well-controlled with Mobic 7.5 mg tablet daily  -Denies side effects  -Continue same therapy    Return if symptoms worsen or fail to improve.    Please note that this dictation was created using voice recognition software. I have made every reasonable attempt to correct obvious errors, but I expect that there are errors of grammar and possibly content that I did not discover before finalizing the note.

## 2025-02-21 NOTE — TELEPHONE ENCOUNTER
I called Pati to let her know about normal results from chest xrays.  She will let me know if she is not feeling better within 3 days.

## 2025-04-23 ENCOUNTER — TELEPHONE (OUTPATIENT)
Dept: HEALTH INFORMATION MANAGEMENT | Facility: OTHER | Age: 70
End: 2025-04-23
Payer: MEDICARE

## 2025-05-10 ENCOUNTER — HOSPITAL ENCOUNTER (OUTPATIENT)
Dept: LAB | Facility: MEDICAL CENTER | Age: 70
End: 2025-05-10
Attending: STUDENT IN AN ORGANIZED HEALTH CARE EDUCATION/TRAINING PROGRAM
Payer: MEDICARE

## 2025-05-10 DIAGNOSIS — E55.9 VITAMIN D INSUFFICIENCY: ICD-10-CM

## 2025-05-10 DIAGNOSIS — Z00.00 BLOOD TESTS FOR ROUTINE GENERAL PHYSICAL EXAMINATION: ICD-10-CM

## 2025-05-10 DIAGNOSIS — R73.03 PREDIABETES: ICD-10-CM

## 2025-05-10 LAB
25(OH)D3 SERPL-MCNC: 29 NG/ML (ref 30–100)
BASOPHILS # BLD AUTO: 1.2 % (ref 0–1.8)
BASOPHILS # BLD: 0.08 K/UL (ref 0–0.12)
EOSINOPHIL # BLD AUTO: 0.37 K/UL (ref 0–0.51)
EOSINOPHIL NFR BLD: 5.7 % (ref 0–6.9)
ERYTHROCYTE [DISTWIDTH] IN BLOOD BY AUTOMATED COUNT: 46.2 FL (ref 35.9–50)
EST. AVERAGE GLUCOSE BLD GHB EST-MCNC: 117 MG/DL
HBA1C MFR BLD: 5.7 % (ref 4–5.6)
HCT VFR BLD AUTO: 45.6 % (ref 37–47)
HGB BLD-MCNC: 15.1 G/DL (ref 12–16)
IMM GRANULOCYTES # BLD AUTO: 0.02 K/UL (ref 0–0.11)
IMM GRANULOCYTES NFR BLD AUTO: 0.3 % (ref 0–0.9)
LYMPHOCYTES # BLD AUTO: 1.56 K/UL (ref 1–4.8)
LYMPHOCYTES NFR BLD: 24.2 % (ref 22–41)
MCH RBC QN AUTO: 31.7 PG (ref 27–33)
MCHC RBC AUTO-ENTMCNC: 33.1 G/DL (ref 32.2–35.5)
MCV RBC AUTO: 95.6 FL (ref 81.4–97.8)
MONOCYTES # BLD AUTO: 0.41 K/UL (ref 0–0.85)
MONOCYTES NFR BLD AUTO: 6.4 % (ref 0–13.4)
NEUTROPHILS # BLD AUTO: 4 K/UL (ref 1.82–7.42)
NEUTROPHILS NFR BLD: 62.2 % (ref 44–72)
NRBC # BLD AUTO: 0 K/UL
NRBC BLD-RTO: 0 /100 WBC (ref 0–0.2)
PLATELET # BLD AUTO: 285 K/UL (ref 164–446)
PMV BLD AUTO: 10.4 FL (ref 9–12.9)
RBC # BLD AUTO: 4.77 M/UL (ref 4.2–5.4)
WBC # BLD AUTO: 6.4 K/UL (ref 4.8–10.8)

## 2025-05-10 PROCEDURE — 82306 VITAMIN D 25 HYDROXY: CPT

## 2025-05-10 PROCEDURE — 83036 HEMOGLOBIN GLYCOSYLATED A1C: CPT

## 2025-05-10 PROCEDURE — 36415 COLL VENOUS BLD VENIPUNCTURE: CPT

## 2025-05-10 PROCEDURE — 85025 COMPLETE CBC W/AUTO DIFF WBC: CPT

## 2025-05-13 ENCOUNTER — RESEARCH ENCOUNTER (OUTPATIENT)
Dept: RESEARCH | Facility: MEDICAL CENTER | Age: 70
End: 2025-05-13

## 2025-05-13 ENCOUNTER — OFFICE VISIT (OUTPATIENT)
Dept: MEDICAL GROUP | Age: 70
End: 2025-05-13
Payer: MEDICARE

## 2025-05-13 VITALS
SYSTOLIC BLOOD PRESSURE: 122 MMHG | BODY MASS INDEX: 21.97 KG/M2 | OXYGEN SATURATION: 100 % | DIASTOLIC BLOOD PRESSURE: 74 MMHG | HEART RATE: 78 BPM | WEIGHT: 140 LBS | TEMPERATURE: 98.8 F | RESPIRATION RATE: 16 BRPM | HEIGHT: 67 IN

## 2025-05-13 DIAGNOSIS — K58.8 OTHER IRRITABLE BOWEL SYNDROME: ICD-10-CM

## 2025-05-13 DIAGNOSIS — R73.03 PREDIABETES: ICD-10-CM

## 2025-05-13 DIAGNOSIS — M17.12 PRIMARY OSTEOARTHRITIS OF LEFT KNEE: ICD-10-CM

## 2025-05-13 DIAGNOSIS — E55.9 VITAMIN D INSUFFICIENCY: ICD-10-CM

## 2025-05-13 PROCEDURE — 3078F DIAST BP <80 MM HG: CPT | Performed by: STUDENT IN AN ORGANIZED HEALTH CARE EDUCATION/TRAINING PROGRAM

## 2025-05-13 PROCEDURE — 3074F SYST BP LT 130 MM HG: CPT | Performed by: STUDENT IN AN ORGANIZED HEALTH CARE EDUCATION/TRAINING PROGRAM

## 2025-05-13 PROCEDURE — 99214 OFFICE O/P EST MOD 30 MIN: CPT | Performed by: STUDENT IN AN ORGANIZED HEALTH CARE EDUCATION/TRAINING PROGRAM

## 2025-05-13 ASSESSMENT — ENCOUNTER SYMPTOMS
SHORTNESS OF BREATH: 0
BLOOD IN STOOL: 0
CHILLS: 0
BRUISES/BLEEDS EASILY: 0
BLURRED VISION: 0
ABDOMINAL PAIN: 0
DOUBLE VISION: 0
ORTHOPNEA: 0
DEPRESSION: 0
WHEEZING: 0
WEAKNESS: 0
COUGH: 0
PALPITATIONS: 0
FEVER: 0
DIZZINESS: 0
BACK PAIN: 0
HEADACHES: 0

## 2025-05-13 ASSESSMENT — FIBROSIS 4 INDEX: FIB4 SCORE: 1.29

## 2025-05-13 ASSESSMENT — LIFESTYLE VARIABLES: SUBSTANCE_ABUSE: 0

## 2025-05-13 NOTE — PROGRESS NOTES
Subjective:     CC: Lab review follow-up    HPI:   History of Present Illness  The patient is a 69-year-old female presenting for an autoimmune follow-up.    She reports an improvement in her respiratory condition, which was previously managed with prednisone and an inhaler. The symptoms resolved after 2 weeks of treatment, although the condition lasted for a total of 6 weeks.    Recent blood work shows stable results, with a slight abnormality in vitamin D levels, which are borderline low at 29. She continues to take over-the-counter vitamin D supplements. Her A1c level remains unchanged at 5.7 despite dietary modifications, including the elimination of breakfast breads and English muffins.    She expresses dissatisfaction with the efficacy of Bentyl in managing her irritable bowel syndrome, particularly in controlling spasms. Consequently, she discontinued its use.    She receives annual cortisone injections in her knee, with the next one scheduled for 06/2025. She is considering Monovisc as an alternative treatment option.       ROS:  Review of Systems   Constitutional:  Negative for chills, fever and malaise/fatigue.   HENT:  Negative for nosebleeds and tinnitus.    Eyes:  Negative for blurred vision and double vision.   Respiratory:  Negative for cough, shortness of breath and wheezing.    Cardiovascular:  Negative for chest pain, palpitations, orthopnea and leg swelling.   Gastrointestinal:  Negative for abdominal pain, blood in stool and melena.   Genitourinary:  Negative for dysuria and urgency.   Musculoskeletal:  Negative for back pain and joint pain.   Skin:  Negative for rash.   Neurological:  Negative for dizziness, weakness and headaches.   Endo/Heme/Allergies:  Does not bruise/bleed easily.   Psychiatric/Behavioral:  Negative for depression, substance abuse and suicidal ideas.        Objective:     Exam:  /74 (BP Location: Right arm, Patient Position: Sitting, BP Cuff Size: Adult)   Pulse 78    "Temp 37.1 °C (98.8 °F) (Temporal)   Resp 16   Ht 1.702 m (5' 7\")   Wt 63.5 kg (140 lb)   SpO2 100%   BMI 21.93 kg/m²  Body mass index is 21.93 kg/m².    Physical Exam  Vitals reviewed.   Constitutional:       General: She is not in acute distress.  HENT:      Head: Normocephalic and atraumatic.      Mouth/Throat:      Mouth: Mucous membranes are moist.   Eyes:      General: No scleral icterus.     Extraocular Movements: Extraocular movements intact.      Pupils: Pupils are equal, round, and reactive to light.   Cardiovascular:      Rate and Rhythm: Normal rate and regular rhythm.      Pulses: Normal pulses.      Heart sounds: Normal heart sounds. No murmur heard.  Pulmonary:      Effort: Pulmonary effort is normal. No respiratory distress.      Breath sounds: Normal breath sounds. No wheezing.   Abdominal:      General: There is no distension.      Palpations: Abdomen is soft.      Tenderness: There is no abdominal tenderness. There is no guarding or rebound.   Musculoskeletal:      Cervical back: Normal range of motion and neck supple.      Right lower leg: No edema.      Left lower leg: No edema.   Skin:     General: Skin is warm.      Capillary Refill: Capillary refill takes less than 2 seconds.      Coloration: Skin is not jaundiced.      Findings: No bruising.   Neurological:      General: No focal deficit present.      Mental Status: She is alert.   Psychiatric:         Mood and Affect: Mood normal.         Behavior: Behavior normal.         Labs: Reviewed    Assessment & Plan:     1. Other irritable bowel syndrome  - Inquired about alternative medications for IBS as Bentyl was not effective.  - Advised to consult a gastroenterologist for better medication options.  - No new medications prescribed at this visit.    2. Prediabetes  -Chronic, unchanged  - A1c again 5.7%, unchanged from last year  - Encouraged regular exercise, DASH diet or plant-based diet  - We will repeat blood work yearly    3. Vitamin D " insufficiency  - Routine blood work showed borderline low vitamin D levels currently at 29  - Recommend to take over-the-counter supplementation    4. Primary osteoarthritis of left knee  - Receives an annual cortisone shot in her knee, which can slightly elevate A1c levels.  - Informed that Monovisc injections are typically administered by orthopedic specialists, not at this clinic.  - Only Kenalog steroid shots are available at this clinic.       Return in about 6 months (around 11/13/2025) for Meds.    Please note that this dictation was created using voice recognition software. I have made every reasonable attempt to correct obvious errors, but I expect that there are errors of grammar and possibly content that I did not discover before finalizing the note.

## 2025-05-13 NOTE — RESEARCH NOTE
Patient's study status has been changed to Referred by Alex Tellez M.D. The initial referral follow-up message, which includes the consent form link, has been sent to the patient.    Eligible Studies: HNP/MNASH